# Patient Record
Sex: MALE | Race: WHITE | Employment: FULL TIME | URBAN - METROPOLITAN AREA
[De-identification: names, ages, dates, MRNs, and addresses within clinical notes are randomized per-mention and may not be internally consistent; named-entity substitution may affect disease eponyms.]

---

## 2017-05-10 PROCEDURE — 84146 ASSAY OF PROLACTIN: CPT | Performed by: FAMILY MEDICINE

## 2018-02-06 ENCOUNTER — HOSPITAL ENCOUNTER (INPATIENT)
Facility: HOSPITAL | Age: 58
LOS: 2 days | Discharge: HOME | DRG: 638 | End: 2018-02-08
Attending: EMERGENCY MEDICINE | Admitting: HOSPITALIST
Payer: COMMERCIAL

## 2018-02-06 DIAGNOSIS — E11.65 TYPE 2 DIABETES MELLITUS WITH HYPERGLYCEMIA, WITHOUT LONG-TERM CURRENT USE OF INSULIN (HCC): ICD-10-CM

## 2018-02-06 DIAGNOSIS — R73.9 HYPERGLYCEMIA: Primary | ICD-10-CM

## 2018-02-06 DIAGNOSIS — E10.65 NEW ONSET TYPE 1 DIABETES MELLITUS, UNCONTROLLED (HCC): ICD-10-CM

## 2018-02-06 LAB
ALBUMIN SERPL-MCNC: 3.9 G/DL (ref 3.5–4.8)
ALP LIVER SERPL-CCNC: 110 U/L (ref 45–117)
ALT SERPL-CCNC: 53 U/L (ref 17–63)
AST SERPL-CCNC: 22 U/L (ref 15–41)
BASOPHILS # BLD AUTO: 0.05 X10(3) UL (ref 0–0.1)
BASOPHILS NFR BLD AUTO: 0.7 %
BILIRUB SERPL-MCNC: 0.8 MG/DL (ref 0.1–2)
BILIRUB UR QL STRIP.AUTO: NEGATIVE
BUN BLD-MCNC: 32 MG/DL (ref 8–20)
CALCIUM BLD-MCNC: 9.5 MG/DL (ref 8.3–10.3)
CHLORIDE: 87 MMOL/L (ref 101–111)
CLARITY UR REFRACT.AUTO: CLEAR
CO2: 24 MMOL/L (ref 22–32)
CREAT BLD-MCNC: 1.59 MG/DL (ref 0.7–1.3)
EOSINOPHIL # BLD AUTO: 0.12 X10(3) UL (ref 0–0.3)
EOSINOPHIL NFR BLD AUTO: 1.6 %
ERYTHROCYTE [DISTWIDTH] IN BLOOD BY AUTOMATED COUNT: 12.2 % (ref 11.5–16)
EST. AVERAGE GLUCOSE BLD GHB EST-MCNC: 349 MG/DL (ref 68–126)
FIO2: 21 %
GLUCOSE BLD-MCNC: 321 MG/DL (ref 65–99)
GLUCOSE BLD-MCNC: 394 MG/DL (ref 65–99)
GLUCOSE BLD-MCNC: 437 MG/DL (ref 65–99)
GLUCOSE BLD-MCNC: 579 MG/DL (ref 65–99)
GLUCOSE BLD-MCNC: 691 MG/DL (ref 70–99)
GLUCOSE UR STRIP.AUTO-MCNC: >=500 MG/DL
HBA1C MFR BLD HPLC: 13.8 % (ref ?–5.7)
HCT VFR BLD AUTO: 42.6 % (ref 37–53)
HGB BLD-MCNC: 15.6 G/DL (ref 13–17)
IMMATURE GRANULOCYTE COUNT: 0.03 X10(3) UL (ref 0–1)
IMMATURE GRANULOCYTE RATIO %: 0.4 %
KETONES UR STRIP.AUTO-MCNC: NEGATIVE MG/DL
LEUKOCYTE ESTERASE UR QL STRIP.AUTO: NEGATIVE
LYMPHOCYTES # BLD AUTO: 2.63 X10(3) UL (ref 0.9–4)
LYMPHOCYTES NFR BLD AUTO: 35.5 %
M PROTEIN MFR SERPL ELPH: 8.4 G/DL (ref 6.1–8.3)
MCH RBC QN AUTO: 31.3 PG (ref 27–33.2)
MCHC RBC AUTO-ENTMCNC: 36.6 G/DL (ref 31–37)
MCV RBC AUTO: 85.4 FL (ref 80–99)
MONOCYTES # BLD AUTO: 0.71 X10(3) UL (ref 0.1–0.6)
MONOCYTES NFR BLD AUTO: 9.6 %
NEUTROPHIL ABS PRELIM: 3.86 X10 (3) UL (ref 1.3–6.7)
NEUTROPHILS # BLD AUTO: 3.86 X10(3) UL (ref 1.3–6.7)
NEUTROPHILS NFR BLD AUTO: 52.2 %
NITRITE UR QL STRIP.AUTO: NEGATIVE
PH UR STRIP.AUTO: 5 [PH] (ref 4.5–8)
PLATELET # BLD AUTO: 265 10(3)UL (ref 150–450)
POTASSIUM SERPL-SCNC: 4.7 MMOL/L (ref 3.6–5.1)
PROLACTIN: 115.6 NG/ML (ref 2.5–17.4)
PROT UR STRIP.AUTO-MCNC: NEGATIVE MG/DL
RBC # BLD AUTO: 4.99 X10(6)UL (ref 4.3–5.7)
RBC UR QL AUTO: NEGATIVE
RED CELL DISTRIBUTION WIDTH-SD: 37.7 FL (ref 35.1–46.3)
SODIUM SERPL-SCNC: 123 MMOL/L (ref 136–144)
SP GR UR STRIP.AUTO: 1.02 (ref 1–1.03)
UROBILINOGEN UR STRIP.AUTO-MCNC: <2 MG/DL
VENOUS BASE EXCESS: 0.4
VENOUS BLOOD GAS HCO3: 25.5 MEQ/L (ref 23–27)
VENOUS O2 SAT CALC: 89 % (ref 72–78)
VENOUS O2 SATURATION: 88 % (ref 72–78)
VENOUS PCO2: 43 MM HG (ref 38–50)
VENOUS PH: 7.4 (ref 7.33–7.43)
VENOUS PO2: 56 MM HG (ref 30–50)
WBC # BLD AUTO: 7.4 X10(3) UL (ref 4–13)

## 2018-02-06 PROCEDURE — 82570 ASSAY OF URINE CREATININE: CPT | Performed by: FAMILY MEDICINE

## 2018-02-06 PROCEDURE — 82043 UR ALBUMIN QUANTITATIVE: CPT | Performed by: FAMILY MEDICINE

## 2018-02-06 RX ORDER — DEXTROSE MONOHYDRATE 25 G/50ML
50 INJECTION, SOLUTION INTRAVENOUS
Status: DISCONTINUED | OUTPATIENT
Start: 2018-02-06 | End: 2018-02-08

## 2018-02-06 RX ORDER — ALLOPURINOL 100 MG/1
100 TABLET ORAL DAILY
COMMUNITY
End: 2018-05-02

## 2018-02-06 RX ORDER — HEPARIN SODIUM 5000 [USP'U]/ML
7500 INJECTION, SOLUTION INTRAVENOUS; SUBCUTANEOUS EVERY 8 HOURS SCHEDULED
Status: DISCONTINUED | OUTPATIENT
Start: 2018-02-06 | End: 2018-02-08

## 2018-02-06 RX ORDER — DEXTROSE MONOHYDRATE 25 G/50ML
50 INJECTION, SOLUTION INTRAVENOUS
Status: DISCONTINUED | OUTPATIENT
Start: 2018-02-06 | End: 2018-02-06

## 2018-02-06 RX ORDER — ACETAMINOPHEN 325 MG/1
650 TABLET ORAL EVERY 6 HOURS PRN
Status: DISCONTINUED | OUTPATIENT
Start: 2018-02-06 | End: 2018-02-08

## 2018-02-06 RX ORDER — SODIUM CHLORIDE 9 MG/ML
INJECTION, SOLUTION INTRAVENOUS CONTINUOUS
Status: DISCONTINUED | OUTPATIENT
Start: 2018-02-06 | End: 2018-02-08

## 2018-02-06 RX ORDER — POLYVINYL ALCOHOL 14 MG/ML
1 SOLUTION/ DROPS OPHTHALMIC AS NEEDED
COMMUNITY

## 2018-02-06 RX ORDER — ONDANSETRON 2 MG/ML
4 INJECTION INTRAMUSCULAR; INTRAVENOUS EVERY 6 HOURS PRN
Status: DISCONTINUED | OUTPATIENT
Start: 2018-02-06 | End: 2018-02-08

## 2018-02-06 RX ORDER — QUINAPRIL HCL AND HYDROCHLOROTHIAZIDE 20; 25 MG/1; MG/1
1 TABLET ORAL DAILY
COMMUNITY
End: 2018-04-12

## 2018-02-06 NOTE — ED INITIAL ASSESSMENT (HPI)
Pt arrives from home with high blood sugar. Pt was told previously he was borderline diabetic. +polyuria, +polydipsia. +vision change. +nausea, denies emesis. A&A. VSS. BS >800.  No hx of diabities

## 2018-02-07 PROBLEM — E66.01 OBESITY, MORBID, BMI 50 OR HIGHER (HCC): Status: ACTIVE | Noted: 2018-02-07

## 2018-02-07 PROBLEM — E11.65 TYPE 2 DIABETES MELLITUS WITH HYPERGLYCEMIA (HCC): Status: ACTIVE | Noted: 2018-02-06

## 2018-02-07 LAB
ATRIAL RATE: 79 BPM
BASOPHILS # BLD AUTO: 0.05 X10(3) UL (ref 0–0.1)
BASOPHILS NFR BLD AUTO: 0.8 %
BUN BLD-MCNC: 24 MG/DL (ref 8–20)
CALCIUM BLD-MCNC: 8.9 MG/DL (ref 8.3–10.3)
CHLORIDE: 96 MMOL/L (ref 101–111)
CO2: 30 MMOL/L (ref 22–32)
CORTISOL: 13 UG/DL
CREAT BLD-MCNC: 1.19 MG/DL (ref 0.7–1.3)
EOSINOPHIL # BLD AUTO: 0.21 X10(3) UL (ref 0–0.3)
EOSINOPHIL NFR BLD AUTO: 3.5 %
ERYTHROCYTE [DISTWIDTH] IN BLOOD BY AUTOMATED COUNT: 12.2 % (ref 11.5–16)
FSH: 1.6 MIU/ML (ref 0.7–10.8)
GLUCOSE BLD-MCNC: 231 MG/DL (ref 65–99)
GLUCOSE BLD-MCNC: 239 MG/DL (ref 70–99)
GLUCOSE BLD-MCNC: 248 MG/DL (ref 65–99)
GLUCOSE BLD-MCNC: 249 MG/DL (ref 65–99)
GLUCOSE BLD-MCNC: 251 MG/DL (ref 65–99)
GLUCOSE BLD-MCNC: 259 MG/DL (ref 65–99)
GLUCOSE BLD-MCNC: 266 MG/DL (ref 65–99)
GLUCOSE BLD-MCNC: 270 MG/DL (ref 65–99)
GLUCOSE BLD-MCNC: 290 MG/DL (ref 65–99)
GLUCOSE BLD-MCNC: 296 MG/DL (ref 65–99)
GLUCOSE BLD-MCNC: 298 MG/DL (ref 65–99)
GLUCOSE BLD-MCNC: 302 MG/DL (ref 65–99)
GLUCOSE BLD-MCNC: 316 MG/DL (ref 65–99)
GLUCOSE BLD-MCNC: 347 MG/DL (ref 65–99)
HAV IGM SER QL: 2 MG/DL (ref 1.7–3)
HCT VFR BLD AUTO: 40 % (ref 37–53)
HGB BLD-MCNC: 14.4 G/DL (ref 13–17)
IMMATURE GRANULOCYTE COUNT: 0.02 X10(3) UL (ref 0–1)
IMMATURE GRANULOCYTE RATIO %: 0.3 %
LH: 0.3 MIU/ML (ref 1.2–10.6)
LYMPHOCYTES # BLD AUTO: 2.42 X10(3) UL (ref 0.9–4)
LYMPHOCYTES NFR BLD AUTO: 39.8 %
MCH RBC QN AUTO: 31.2 PG (ref 27–33.2)
MCHC RBC AUTO-ENTMCNC: 36 G/DL (ref 31–37)
MCV RBC AUTO: 86.6 FL (ref 80–99)
MONOCYTES # BLD AUTO: 0.71 X10(3) UL (ref 0.1–0.6)
MONOCYTES NFR BLD AUTO: 11.7 %
NEUTROPHIL ABS PRELIM: 2.67 X10 (3) UL (ref 1.3–6.7)
NEUTROPHILS # BLD AUTO: 2.67 X10(3) UL (ref 1.3–6.7)
NEUTROPHILS NFR BLD AUTO: 43.9 %
P AXIS: 49 DEGREES
P-R INTERVAL: 172 MS
PHOSPHATE SERPL-MCNC: 3.1 MG/DL (ref 2.5–4.9)
PLATELET # BLD AUTO: 227 10(3)UL (ref 150–450)
POTASSIUM SERPL-SCNC: 3.8 MMOL/L (ref 3.6–5.1)
Q-T INTERVAL: 392 MS
QRS DURATION: 94 MS
QTC CALCULATION (BEZET): 449 MS
R AXIS: -2 DEGREES
RBC # BLD AUTO: 4.62 X10(6)UL (ref 4.3–5.7)
RED CELL DISTRIBUTION WIDTH-SD: 38.4 FL (ref 35.1–46.3)
SODIUM SERPL-SCNC: 132 MMOL/L (ref 136–144)
T AXIS: 29 DEGREES
TSI SER-ACNC: 0.61 MIU/ML (ref 0.35–5.5)
VENTRICULAR RATE: 79 BPM
WBC # BLD AUTO: 6.1 X10(3) UL (ref 4–13)

## 2018-02-07 PROCEDURE — 5A09357 ASSISTANCE WITH RESPIRATORY VENTILATION, LESS THAN 24 CONSECUTIVE HOURS, CONTINUOUS POSITIVE AIRWAY PRESSURE: ICD-10-PCS | Performed by: INTERNAL MEDICINE

## 2018-02-07 PROCEDURE — 99233 SBSQ HOSP IP/OBS HIGH 50: CPT | Performed by: CLINICAL NURSE SPECIALIST

## 2018-02-07 RX ORDER — MAGNESIUM OXIDE 400 MG (241.3 MG MAGNESIUM) TABLET
800 TABLET ONCE
Status: DISCONTINUED | OUTPATIENT
Start: 2018-02-07 | End: 2018-02-07

## 2018-02-07 RX ORDER — POTASSIUM CHLORIDE 20 MEQ/1
40 TABLET, EXTENDED RELEASE ORAL ONCE
Status: COMPLETED | OUTPATIENT
Start: 2018-02-07 | End: 2018-02-07

## 2018-02-07 NOTE — CONSULTS
[facility]  Diabetes Clinical Nurse Specialist Consult Note    Gerdanicoles Mishel Patient Status:  Inpatient    3/13/1960 MRN [id number]   [facility] 4SW-A Attending Marthenia Goodell, MD   Hosp Day # 1 PCP Marci Carrera MD that he had not followed up with his doctor as ordered, and states he will begin diet and exercise. I did warn him that he may still need to take some insulin.  Also asked whether he had ever considered a gastric bypass, he said he would not consider it, al BC-ADM  Clinical Nurse Specialist  Diabetes Educator  2/7/2018  9:37 AM

## 2018-02-07 NOTE — PROGRESS NOTES
[facility] Progress Note:  Anticoagulation Weight Dose Adjustment for heparin    Marlen Liang is a 56year old male who has been prescribed heparin 5000 units every 8 hrs for VTE prophylaxis.       Estimated Creatinine Clearance: 57.6 mL/min (based

## 2018-02-07 NOTE — PROGRESS NOTES
Patient off insulin gtt since 11 am today.  beofore lunch    this evening beofore dinner. Patient giving himself insulin injection per carb counting and correction. Diabetic education re: insulin injection site rotation.   Patient able to do ret

## 2018-02-07 NOTE — H&P
[facility] Hospitalist History and Physical      Patient presents with:  Abnormal Result (metabolic, cardiac)       PCP: Ritika Palmer MD      History of Present Illness: Patient is a 56year old male with PMH sig for morbid obesity, DM2 presents for eval rashes or lesions.     Neurologic: Normal strength, no focal deficit appreciated     Data Review:    LABS:     Lab Results  Component Value Date   WBC 6.1 02/07/2018   HGB 14.4 02/07/2018   HCT 40.0 02/07/2018   .0 02/07/2018   CREATSERUM 1.19 02/07/ Ulcerative colitis

## 2018-02-07 NOTE — CONSULTS
[facility]  Report of Consultation    Corinna Marcial Patient Status:  Inpatient    3/13/1960 MRN [id number]   [facility] 4SW-A Attending Rajesh Kevin MD   Hosp Day # 1 PCP Ritika Palmer MD     Reason for [facility] flextouch 45 Units, 45 Units, Subcutaneous, [email or web address]  •  Insulin Aspart Pen (NOVOLOG) 100 UNIT/ML flexpen 1-50 Units, 1-50 Units, Subcutaneous, TID CC  •  Insulin Aspart Pen (NOVOLOG) 100 UNIT/ML flexpen 1-60 Units, 1-60 Units, Subcutaneous, TID CC an 14.4 02/07/2018   HCT 40.0 02/07/2018   .0 02/07/2018   CREATSERUM 1.19 02/07/2018   BUN 24 02/07/2018    02/07/2018   K 3.8 02/07/2018   CL 96 02/07/2018   CO2 30.0 02/07/2018    02/07/2018   CA 8.9 02/07/2018   ALB 3.9 02/06/2018   AL deficiency  - unclear history - who was following this  - apparently not on medical treatment but seeing neurosurgery   - PRL this admission 115.6  - hx of low IGF-1 in the past   - check other pituitary labs (ACTH, cortisol, IGF-1, FSH, LH, testosterone)

## 2018-02-07 NOTE — PAYOR COMM NOTE
--------------  ADMISSION REVIEW     Payor: [facility]  Subscriber #:  [id number]  Authorization Number: N/A    Admit date: 2/6/18  Admit time: 2012       Admitting Physician: Roberto Carlos Diana MD  Attending Physician:  Seema Myers MD  Primary Care 97.8 °F (36.6 °C) [02/06/18 1802]  Temp src: Temporal [02/06/18 1802]  SpO2: 93 % [02/06/18 1800]  O2 Device: None (Room air) [02/06/18 1802][NS.2]    Current:[NS.1]/79   Pulse 85   Temp 97.8 °F (36.6 °C) (Temporal)   Resp 20   Ht 182.9 cm (6')   Wt -----------         ------                     CBC W/ DIFFERENTIAL[[id number]]          Abnormal            Final result                 Please view results for these tests on the individual orders.    [facility] Hyperglycemia R73.9 2/6/2018 Unknown[NS.2]              Signed by Naz Foster DO on 2/6/2018  7:44 PM                  H&P Note     No notes of this type exist for this encounter.           MEDICATIONS ADMINISTERED IN LAST 1 DAY:  Heparin Sodium (Porci 0.9%  NaCl infusion     Date Action Dose Route User    2/7/2018 0513 New [address] (none) Intravenous Isaias Figueroa RN    2/6/2018 2738 New Bag (none) Intravenous Isaias Figueroa RN      sodium chloride 0.9% IV bolus 1,000 mL     Date Action Dose Route comprehensive 14 point review of systems was completed.   Pertinent positives and negatives noted in the HPI.     OBJECTIVE  Vitals:  /75 (BP Location: Left arm)   Pulse 83   Temp (!) 97 °F (36.1 °C) (Temporal)   Resp 20   Ht 182.9 cm (6')   Wt (!) 37 -NS  -Follow electrolytes and replete as indicated  -ADA     Proph:  -SQ heparin  -SCD     Dispo:   -Full code  -ICU for glucose management     Plan of care discussed with intensivist on-call, LUBNA Vyas  Critical Care NP  Irma Dodge

## 2018-02-07 NOTE — CONSULTS
Pulmonary / Critical Care H&P/Consult       NAME: Tobin Campa - ROOM: 505/109-B - MRN: [id number] - Age: 56year old - :  3/13/1960    Date of Admission: 2018  5:50 PM  Admission Diagnosis: Hyperglycemia [R73.9]  New onset type 1 diabetes maximus [email or web address]   • Insulin Aspart Pen  1-50 Units Subcutaneous TID CC   • Insulin Aspart Pen  1-60 Units Subcutaneous TID CC and HS   • Heparin Sodium (Porcine)  7,500 Units Subcutaneous Q8H [address]     Continuous Infusing Medication:  • sodium chl Extremities:   Extremities normal, atraumatic, no cyanosis or edema   Pulses:   2+ and symmetric all extremities   Skin:   Skin color, texture, turgor normal, no rashes or lesions         Recent Labs   Lab  02/06/18   1820  02/07/18   0648   WBC  7.4  6.

## 2018-02-07 NOTE — PROGRESS NOTES
ICU  Critical Care APN Progress Note    NAME: Raj Cobian - ROOM: 130/334-P - MRN: [id number] - Age: 56year old - :3/13/1960    History Of Present Illness:  Raj Cobian is a 56year old male with PMHx significant for HTN, HLD, and known cooperative, no distress, appears stated age  Neck: No JVD, neck supple, no adenopathy, trachea midline, no carotid bruits  Lungs: Clear to auscultation bilaterally, respirations unlabored  Heart: Regular rate and rhythm, S1 and S2 normal, no murmur, rub o administered. This involved direct patient intervention, complex decision making, and/or extensive discussions with the patient, family, and clinical staff.

## 2018-02-07 NOTE — RESPIRATORY THERAPY NOTE
CALE Equipment Usage Summary :            Set Mode :AUTO CPAP W FLEX          Usage in Hours:5;51          90% Pressure (EPAP) : 12.3           90% Insp Pressure (IPAP);           AHI : 13          Supplemental Oxygen :      LPM

## 2018-02-07 NOTE — PLAN OF CARE
Diabetes/Glucose Control    • Glucose maintained within prescribed range Progressing        Received patient from ER at 2015. Alert and oriented, follows commands, moves all extremities appropriately. On room air with clear breath sounds.  Vital signs stabl

## 2018-02-07 NOTE — CM/SW NOTE
Responding to SW order for new diabetes. Pt has [employer] so has coverage for needs. Met and spoke with pt at his ICU bedside. Introduced myself and role of CM/SW in ICU. Pt has PCP that he has been following with.  Printed some info on network endocrine M

## 2018-02-07 NOTE — PLAN OF CARE
Diabetes/Glucose Control    • Glucose maintained within prescribed range Progressing        Patient/Family Goals    • Patient/Family Long Term Goal Progressing    • Patient/Family Short Term Goal Progressing        Assumed care at 0700. Patient is A&O x3.

## 2018-02-07 NOTE — ED NOTES
Report called to Tyree Mckeon RN. Pt transported on cart to room 452 on monitor with RN to ICU.  Pt stable at time of transfer

## 2018-02-08 VITALS
RESPIRATION RATE: 22 BRPM | HEART RATE: 79 BPM | BODY MASS INDEX: 42.66 KG/M2 | SYSTOLIC BLOOD PRESSURE: 126 MMHG | TEMPERATURE: 97 F | DIASTOLIC BLOOD PRESSURE: 74 MMHG | HEIGHT: 72 IN | WEIGHT: 315 LBS | OXYGEN SATURATION: 95 %

## 2018-02-08 LAB
BUN BLD-MCNC: 17 MG/DL (ref 8–20)
C-PEPTIDE, SERUM OR PLASMA: 1.1 NG/ML
CALCIUM BLD-MCNC: 8.7 MG/DL (ref 8.3–10.3)
CHLORIDE: 102 MMOL/L (ref 101–111)
CO2: 25 MMOL/L (ref 22–32)
CREAT BLD-MCNC: 0.96 MG/DL (ref 0.7–1.3)
FREE T4: 1 NG/DL (ref 0.9–1.8)
GLUCOSE BLD-MCNC: 196 MG/DL (ref 70–99)
GLUCOSE BLD-MCNC: 232 MG/DL (ref 65–99)
GLUCOSE BLD-MCNC: 311 MG/DL (ref 65–99)
GLUCOSE BLD-MCNC: >600 MG/DL (ref 65–99)
HAV IGM SER QL: 2 MG/DL (ref 1.7–3)
IGF 1 Z SCORE CALCULATION: -5.6
IGF-1 (INSULINE-LIKE GROWTH FACTOR 1): 22 NG/ML
PHOSPHATE SERPL-MCNC: 2.6 MG/DL (ref 2.5–4.9)
POTASSIUM SERPL-SCNC: 3.9 MMOL/L (ref 3.6–5.1)
SODIUM SERPL-SCNC: 137 MMOL/L (ref 136–144)

## 2018-02-08 RX ORDER — INSULIN LISPRO 100 [IU]/ML
INJECTION, SOLUTION INTRAVENOUS; SUBCUTANEOUS
Qty: 90 ML | Refills: 3 | Status: SHIPPED | OUTPATIENT
Start: 2018-02-08 | End: 2018-04-09

## 2018-02-08 NOTE — DISCHARGE SUMMARY
General Medicine Discharge Summary     Patient ID:  Evelyne Anaya  56year old  3/13/1960    Admit date: 2/6/2018    Discharge date and time:2/8/2018    Attending Physician: Lesly Mejia correction of hyperglycemia    pituatary adenoma  Hyperprolactinemia  GH deficiency   - per endo.   Needs OP MRI pituatary        Consults: IP CONSULT TO HOSPITALIST  IP CONSULT TO PULMONOLOGY  IP CONSULT TO FAMILY/INTERNAL MED  IP CONSULT TO SOCIAL WORK  I time coordinating care for discharge: Greater than 30 minutes    . [place] Hospitalist  [phone]

## 2018-02-08 NOTE — PAYOR COMM NOTE
--------------  CONTINUED STAY REVIEW    Payor: [employer]  Subscriber #:  [id number]  Authorization Number: [id number]    Admit date: 2/6/18  Admit time: 2012    Admitting Physician: Radha Lozano MD  Attending Physician:  Courtney Maria MD  [facility] Maurice Vargas RN      0.9%  NaCl infusion     Date Action Dose Route User    2/8/2018 5658 New [address] (none) Intravenous Yaneli Velasco RN    2/7/2018 2120 New Bag (none) Intravenous Yaneli Velasco RN    2/7/2018 1700 Rate/Dose Verify (none) [facility]ous [facility]

## 2018-02-08 NOTE — PROGRESS NOTES
Pulmonary Progress Note     Assessment / Plan:  1. Hyperglycemia: new onset DM. Not in DKA  - per endo  2. CALE:  - cont pap  3. Renal insufficiency: from dehydration  - improved with IVFs  4. Prophy: heparin sq  5.  Dispo: okay to transfer to floor vs dc ho

## 2018-02-08 NOTE — PLAN OF CARE
Diabetes/Glucose Control    • Glucose maintained within prescribed range Adequate for Discharge        Patient/Family Goals    • Patient/Family Long Term Goal Adequate for Discharge    • Patient/Family Short Term Goal Adequate for Discharge        Tolerate

## 2018-02-08 NOTE — PROGRESS NOTES
[facility]  Progress Note    Jeffrey Mora Patient Status:  Inpatient    3/13/1960 MRN [id number]   [facility] 4SW-A Attending Antoni Gomez MD   [facility] Day # 2 PCP Arash Gary MD       SUBJECTIVE:  No acute events Medications:  insulin detemir (LEVEMIR) 100 UNIT/ML flextouch 60 Units 60 Units Subcutaneous [email or web address]   Insulin Aspart Pen (NOVOLOG) 100 UNIT/ML flexpen 1-50 Units 1-50 Units Subcutaneous TID CC   Insulin Aspart Pen (NOVOLOG) 100 UNIT/ML flexpen 1-60 been seeing neurosurgery in the past   - PRL this admission 115.6 (high)   - IGF-1 22 (low)   - LH 0.3 (low)   - TSH, free T4, FSH, cortisol normal  - testosterone, ACTH pending  - will need open MRI as outpatient       5.  Hyperlipidemia - elevated TG to 7

## 2018-02-08 NOTE — CM/SW NOTE
Advised in ICU rounds that pt anticipated for d/c home today. No new or further needs identified at this time.     Tina Obando, 02/08/18, 11:17 AM

## 2018-02-08 NOTE — RESPIRATORY THERAPY NOTE
CALE Equipment Usage Summary :            Set Mode :AUTO CPAP W FLEX          Usage in Hours:7;52             90% Pressure (EPAP) :  9.8          90% Insp Pressure (IPAP)          AHI : 5          Supplemental Oxygen :      LPM

## 2018-02-08 NOTE — PLAN OF CARE
Assumed care at [address] [place] [address]. A&Ox4. VSS. Room air. CPAP at night. New diabetic, continuing education. Ok to transfer to floor or possible dc home today. Will continue to monitor.        Diabetes/Glucose Control    • Glucose maintained within prescribed r

## 2018-02-09 LAB — ADRENOCORTICOTROPIC HORMONE: 23 PG/ML

## 2018-02-12 NOTE — PAYOR COMM NOTE
--------------  DISCHARGE REVIEW    Payor: [employer]  Subscriber #:  [id number]  Authorization Number: [id number]    Admit date: 2/6/18  Admit time:  2012  Discharge Date: 2/8/2018  2:50 PM     Admitting Physician: Priscila Hauser MD  Attending Physician: visit      Educator, Amanda Barrow Diabetes Nurse  [address]  [phone]    Schedule an appointment as soon as possible for a visit  Patient agrees to schedule for diabetes followup - can schedule Gabby Jimenez MD  91 MG Oral Tab  Take 100 mg by mouth daily. Quinapril-Hydrochlorothiazide 20-25 MG Oral Tab  Take 1 tablet by mouth daily. Polyvinyl Alcohol (ARTIFICIAL TEARS) 1.4 % Ophthalmic Solution  Place 1 drop into both eyes as needed.     spironolactone 25 MG Ora

## 2018-02-13 PROBLEM — E10.65 NEW ONSET TYPE 1 DIABETES MELLITUS, UNCONTROLLED (HCC): Status: RESOLVED | Noted: 2018-02-06 | Resolved: 2018-02-13

## 2018-02-13 LAB
TESTOSTERONE TOTAL: 8.6 NG/DL
TESTOSTERONE, FREE -MS/MS: <5 PG/ML

## 2018-02-28 ENCOUNTER — HOSPITAL ENCOUNTER (OUTPATIENT)
Dept: MRI IMAGING | Age: 58
Discharge: HOME | End: 2018-02-28
Attending: INTERNAL MEDICINE
Payer: COMMERCIAL

## 2018-02-28 DIAGNOSIS — E11.65 TYPE 2 DIABETES MELLITUS WITH HYPERGLYCEMIA, WITHOUT LONG-TERM CURRENT USE OF INSULIN (HCC): ICD-10-CM

## 2018-02-28 DIAGNOSIS — R73.9 HYPERGLYCEMIA: ICD-10-CM

## 2018-02-28 PROCEDURE — 70551 MRI BRAIN STEM W/O DYE: CPT | Performed by: INTERNAL MEDICINE

## 2018-02-28 PROCEDURE — 70553 MRI BRAIN STEM W/O & W/DYE: CPT | Performed by: INTERNAL MEDICINE

## 2018-03-01 NOTE — PROGRESS NOTES
Primary patient preferred number  [phone] Cell  Patient informed of Dr. Sue Hennessy note. Patient verbalized understanding and agrees.

## 2018-03-01 NOTE — PROGRESS NOTES
MRI shows 1.2cm pituitary tumor, stable in size compared to previous. Recommend taking cabergoline 1 0.5mg tab twice weekly and repeat PRL in 2 months.

## 2022-10-21 NOTE — ED PROVIDER NOTES
Chart reviewed for upcoming appt   Patient Seen in: [facility] Emergency Department    History   Patient presents with:  Abnormal Result (metabolic, cardiac)    Stated Complaint:     HPI    This is a 56-year-old male with past medical history obesity, gout, hypertension who pre Conjuctiva clear. Mucous membranes are dry. Lungs: Clear to auscultation bilaterally with no rales, no retractions, and no wheezing. HEART:  Regular rate and rhythm. S1 and S2. No murmurs, no rubs or gallops.    ABDOMEN: Soft, nontender and nondistende 1944  ------------------------------------------------------------       MDM     Patient placed on cardiac monitor, continuous pulse oximetry and IV line was established of normal saline. Insulin drip was initiated. Basic labs were obtained.   CBC: White